# Patient Record
(demographics unavailable — no encounter records)

---

## 2024-12-16 NOTE — REASON FOR VISIT
[Follow-Up] : a follow-up visit [Pre-Visit Preparation] : pre-visit preparation was done [FreeTextEntry1] : HTN, Hypothyroidism, insomnia and asthma. [FreeTextEntry2] : Charts reviewed

## 2024-12-16 NOTE — REVIEW OF SYSTEMS
[Joint Pain] : joint pain [Fever] : no fever [Chills] : no chills [Night Sweats] : no night sweats [Discharge] : no discharge [Pain] : no pain [Earache] : no earache [Hearing Loss] : no hearing loss [Nasal Discharge] : no nasal discharge [Chest Pain] : no chest pain [Palpitations] : no palpitations [Shortness Of Breath] : no shortness of breath [Wheezing] : no wheezing [Abdominal Pain] : no abdominal pain [Nausea] : no nausea [Constipation] : no constipation

## 2024-12-16 NOTE — HISTORY OF PRESENT ILLNESS
[Patient is stable] : patient is stable [Education provided] : education provided [Patient is stable - had PCP appoinment] : patient is stable - had PCP appointment [FreeTextEntry1] : HTN, Hypothyroidism, insomnia and asthma. [FreeTextEntry2] :   COVID SCREEN: Patient or caregiver denies fever, cough, trouble breathing, rash or contact with someone who tested positive for COVID-19.   N95 mask, gloves, eye wear and gown (if indicated) used during visit: YES   Total face to face time with patient is 60 min. .          77 y/o Female with above PMH was seen for follow up visit and covid vaccine. patient lives alone.  Patient denied any SOB, chest pain and palpitation. Denied any constipation/Diarrhea No recent hospitalization. Pt lost weight with diet and Exercise.  Reported she has insomnia for few years ,     Patient reports no weight loss >10 lbs in the past 6 months. No changes in dentition or swallowing reported, No changes in hearing or vision reported. No changes in Cognition reported. Patient denies any symptoms of depression or anxiety. Patient is ADL dependent and IADL dependent. No changes in gait or falls reported. Patient's home environment is safe.

## 2024-12-16 NOTE — HEALTH RISK ASSESSMENT
[HRA Reviewed] : Health risk assessment reviewed [Independent] : using telephone [Some assistance needed] : managing finances [No falls in past year] : Patient reported no falls in the past year [No] : The patient does not have visual impairment [TimeGetUpGo] : 6

## 2024-12-16 NOTE — COUNSELING
[Obese -  ( BMI  >29.9 )] : obese - ( BMI  >29.9 ) [DASH diet recommended] : DASH diet recommended [Improve exercise tolerance] : improve exercise tolerance [Improve mobility] : improve mobility [Improve weight] : improve weight [Decrease hospital use] : decrease hospital use

## 2024-12-16 NOTE — PHYSICAL EXAM
[No Acute Distress] : no acute distress [PERRL] : pupils equal, round and reactive to light [Normal Outer Ear/Nose] : the ears and nose were normal in appearance [No JVD] : no jugular venous distention [Clear to Auscultation] : lungs were clear to auscultation bilaterally [Normal Rate] : heart rate was normal  [Regular Rhythm] : with a regular rhythm [Normal Bowel Sounds] : normal bowel sounds [Normal Supraclavicular Nodes] : no supraclavicular lymphadenopathy [Normal Anterior Cervical Nodes] : no anterior cervical lymphadenopathy [Normal Gait] : normal gait [No Rash] : no rash

## 2025-02-27 NOTE — COUNSELING
[Overweight - ( BMI 25.1 - 29.9 )] : overweight -  ( BMI 25.1 - 29.9 ) [DASH diet recommended] : DASH diet recommended [Continue diet as tolerated] : continue diet as tolerated based on goals of care [Non - Smoker] : non-smoker [Use assistive device to avoid falls] : use assistive device to avoid falls [Remove clutter and unsafe carpeting to avoid falls] : remove clutter and unsafe carpeting to avoid falls [] : foot exam [Improve mobility] : improve mobility [Decrease hospital use] : decrease hospital use [Maintain functional ability] : maintain functional ability [Discussed disease trajectory with patient/caregiver] : discussed disease trajectory with patient/caregiver

## 2025-03-06 NOTE — REVIEW OF SYSTEMS
[Vision Problems] : vision problems [Cough] : cough [Diarrhea] : diarrhea [Negative] : Heme/Lymph [Recent Change In Weight] : ~T no recent weight change [Discharge] : no discharge [Hearing Loss] : no hearing loss [Chest Pain] : no chest pain [Palpitations] : no palpitations [Lower Ext Edema] : no lower extremity edema [Abdominal Pain] : no abdominal pain [Nausea] : no nausea [Constipation] : no constipation [Dysuria] : no dysuria [Incontinence] : no incontinence [Joint Pain] : no joint pain [Joint Stiffness] : no joint stiffness [Skin Rash] : no skin rash [Headache] : no headache [Dizziness] : no dizziness [FreeTextEntry7] : After drinking coffee

## 2025-03-06 NOTE — HEALTH RISK ASSESSMENT
[HRA Reviewed] : Health risk assessment reviewed [Independent] : managing finances [Some assistance needed] : using transportation [No falls in past year] : Patient reported no falls in the past year [Yes] : The patient does have visual impairment [TimeGetUpGo] : 4 [de-identified] : ambulates with cane

## 2025-03-06 NOTE — REVIEW OF SYSTEMS
no [Vision Problems] : vision problems [Cough] : cough [Diarrhea] : diarrhea [Negative] : Heme/Lymph [Recent Change In Weight] : ~T no recent weight change [Discharge] : no discharge [Hearing Loss] : no hearing loss [Chest Pain] : no chest pain [Palpitations] : no palpitations [Lower Ext Edema] : no lower extremity edema [Abdominal Pain] : no abdominal pain [Nausea] : no nausea [Constipation] : no constipation [Dysuria] : no dysuria [Incontinence] : no incontinence [Joint Pain] : no joint pain [Joint Stiffness] : no joint stiffness [Skin Rash] : no skin rash [Headache] : no headache [Dizziness] : no dizziness [FreeTextEntry7] : After drinking coffee

## 2025-03-06 NOTE — HISTORY OF PRESENT ILLNESS
[Patient] : patient [LastPVisitDate] : 03/2023 [FreeTextEntry4] : Dr. Power- cardio [LastSpecialistVisitDate] : 07/2023 [FreeTextEntry1] : impaired mobility  [FreeTextEntry2] :    EMA ZAYAS was scheduled for a telehealth visit via [employer] using real-time audio visual technology. Multiple attempts were made to complete the visit using telehealth, but video failed due to [reason] and [time of failure]. This visit was completed using audio only.     EMA ZAYAS (Nov 23 1946) or her representative consented to the use of telehealth and to continue the use visit using audio only.        EMA ZAYAS was located at their home, [address] [place] [address], at the time of the visit.         The [facility] clinician, LAUREL GUIDRY, was located remotely at their home in [place] at the time of the visit.     The patient, EMA ZAYAS, and the [facility] clinician, LAUREL GUIDRY, participated in the encounter.     Other participants included: [? ] none  RSV Vaccine request  Pneumonia vaccine requested  Ema Zayas, 78-year-old female with asthma, anxiety, HLD, HTN, hypothyroidism and impaired mobility presents for follow up.   Discussed with patient need for cardiology follow up. Discussed ECHO and EKG testing, patient requesting female technician and expresses anxiety related to individuals in home. Educated on importance reports will think over it.   Patient denies chest pain, palpitations or SOB, verbalizes dyspnea on exertion when completing tasks in home. ECHO advised. In person visit to be scheduled.    Social: lives alone with social anxiety DME: ambulates with cane Mobility: no falls in past 6 months Pain: Denies  Mental Status: AOX3 Incontinence: no Constipation: no, Last BM yesterday Sleep: not much improved with melatonin, reports improved with Xanax PRN Appetite: eating well, 3 meals  Mood: calm

## 2025-03-06 NOTE — HEALTH RISK ASSESSMENT
[HRA Reviewed] : Health risk assessment reviewed [Independent] : managing finances [Some assistance needed] : using transportation [No falls in past year] : Patient reported no falls in the past year [Yes] : The patient does have visual impairment [TimeGetUpGo] : 4 [de-identified] : ambulates with cane

## 2025-03-06 NOTE — HEALTH RISK ASSESSMENT
[HRA Reviewed] : Health risk assessment reviewed [Independent] : managing finances [Some assistance needed] : using transportation [No falls in past year] : Patient reported no falls in the past year [Yes] : The patient does have visual impairment [TimeGetUpGo] : 4 [de-identified] : ambulates with cane

## 2025-03-06 NOTE — HISTORY OF PRESENT ILLNESS
[Patient] : patient [LastPVisitDate] : 03/2023 [FreeTextEntry4] : Dr. Power- cardio [LastSpecialistVisitDate] : 07/2023 [FreeTextEntry1] : impaired mobility  [FreeTextEntry2] :    EMA ZAYAS was scheduled for a telehealth visit via [employer] using real-time audio visual technology. Multiple attempts were made to complete the visit using telehealth, but video failed due to [reason] and [time of failure]. This visit was completed using audio only.     EMA ZAYAS (Nov 23 1946) or her representative consented to the use of telehealth and to continue the use visit using audio only.        EMA ZAYAS was located at their home, [address] [place] [address], at the time of the visit.         The [facility] clinician, LAUREL GUIDRY, was located remotely at their home in [place] at the time of the visit.     The patient, EMA ZAYAS, and the [facility] clinician, LAUREL GUIDRY, participated in the encounter.     Other participants included: [? ] none  RSV Vaccine request  Pneumonia vaccine requested  Ema Zayas, 78-year-old female with asthma, anxiety, HLD, HTN, hypothyroidism and impaired mobility presents for follow up.   Discussed with patient need for cardiology follow up. Discussed ECHO and EKG testing, patient requesting female technician and expresses anxiety related to individuals in home. Educated on importance reports will think over it.   Patient denies chest pain, palpitations or SOB, verbalizes dyspnea on exertion when completing tasks in home. ECHO advised. In person visit to be scheduled.    Social: lives alone with social anxiety DME: ambulates with cane Mobility: no falls in past 6 months Pain: Denies  Mental Status: AOX3 Incontinence: no Constipation: no, Last BM yesterday Sleep: not much improved with melatonin, reports improved with Xanax PRN Appetite: eating well, 3 meals  Mood: calm

## 2025-03-06 NOTE — HEALTH RISK ASSESSMENT
[HRA Reviewed] : Health risk assessment reviewed [Independent] : managing finances [Some assistance needed] : using transportation [No falls in past year] : Patient reported no falls in the past year [Yes] : The patient does have visual impairment [TimeGetUpGo] : 4 [de-identified] : ambulates with cane

## 2025-03-06 NOTE — HISTORY OF PRESENT ILLNESS
[Patient] : patient [LastPVisitDate] : 03/2023 [FreeTextEntry4] : Dr. Power- cardio [LastSpecialistVisitDate] : 07/2023 [FreeTextEntry1] : impaired mobility  [FreeTextEntry2] :    EMA ZAYAS was scheduled for a telehealth visit via [employer] using real-time audio visual technology. Multiple attempts were made to complete the visit using telehealth, but video failed due to [reason] and [time of failure]. This visit was completed using audio only.     EMA ZAYAS (Nov 23 1946) or her representative consented to the use of telehealth and to continue the use visit using audio only.        EMA ZAYAS was located at their home, [address] [place] [address], at the time of the visit.         The [facility] clinician, LAUREL GUIDRY, was located remotely at their home in [place] at the time of the visit.     The patient, EMA ZAYAS, and the [facility] clinician, LAUREL GUIDRY, participated in the encounter.     Other participants included: [? ] none  RSV Vaccine request  Pneumonia vaccine requested  Ema Zayas, 78-year-old female with asthma, anxiety, HLD, HTN, hypothyroidism and impaired mobility presents for follow up.   Discussed with patient need for cardiology follow up. Discussed ECHO and EKG testing, patient requesting female technician and expresses anxiety related to individuals in home. Educated on importance reports will think over it.   Patient denies chest pain, palpitations or SOB, verbalizes dyspnea on exertion when completing tasks in home. ECHO advised. In person visit to be scheduled.    Social: lives alone with social anxiety DME: ambulates with cane Mobility: no falls in past 6 months Pain: Denies  Mental Status: AOX3 Incontinence: no Constipation: no, Last BM yesterday Sleep: not much improved with melatonin, reports improved with Xanax PRN Appetite: eating well, 3 meals  Mood: calm

## 2025-03-06 NOTE — REASON FOR VISIT
[Telephone (audio)] : This telephonic visit was provided via audio only technology. [Technical] : patient unable to effectively utilize tele-video due to technical issues. [Follow-Up] : a follow-up visit [Pre-Visit Preparation] : pre-visit preparation was done [Intercurrent Specialty/Sub-specialty Visits] : the patient has intercurrent specialty/sub-specialty visits [FreeTextEntry2] : Chart review

## 2025-03-06 NOTE — HISTORY OF PRESENT ILLNESS
[Patient] : patient [LastPVisitDate] : 03/2023 [FreeTextEntry4] : Dr. Power- cardio [LastSpecialistVisitDate] : 07/2023 [FreeTextEntry1] : impaired mobility  [FreeTextEntry2] :    EMA ZAYAS was scheduled for a telehealth visit via [employer] using real-time audio visual technology. Multiple attempts were made to complete the visit using telehealth, but video failed due to [reason] and [time of failure]. This visit was completed using audio only.     EMA ZAYAS (Nov 23 1946) or her representative consented to the use of telehealth and to continue the use visit using audio only.        EMA ZAYAS was located at their home, [address] [place] [address], at the time of the visit.         The [facility] clinician, LAUREL GUIDRY, was located remotely at their home in [place] at the time of the visit.     The patient, EMA ZAYAS, and the [facility] clinician, LAUREL GUIDRY, participated in the encounter.     Other participants included: [? ] none  RSV Vaccine request  Pneumonia vaccine requested  Ema Zayas, 78-year-old female with asthma, anxiety, HLD, HTN, hypothyroidism and impaired mobility presents for follow up.   Discussed with patient need for cardiology follow up. Discussed ECHO and EKG testing, patient requesting female technician and expresses anxiety related to individuals in home. Educated on importance reports will think over it.   Patient denies chest pain, palpitations or SOB, verbalizes dyspnea on exertion when completing tasks in home. ECHO advised. In person visit to be scheduled.    Social: lives alone with social anxiety DME: ambulates with cane Mobility: no falls in past 6 months Pain: Denies  Mental Status: AOX3 Incontinence: no Constipation: no, Last BM yesterday Sleep: not much improved with melatonin, reports improved with Xanax PRN Appetite: eating well, 3 meals  Mood: calm

## 2025-03-21 NOTE — HISTORY OF PRESENT ILLNESS
[Patient] : patient [LastPVisitDate] : 03/2023 [FreeTextEntry4] : Dr. Power- cardio [LastSpecialistVisitDate] : 07/2023 [FreeTextEntry1] : impaired mobility  [FreeTextEntry2] :    EMA ZAYAS was scheduled for a telehealth visit via [employer] using real-time audio visual technology. Multiple attempts were made to complete the visit using telehealth, but video failed due to [reason] and [time of failure]. This visit was completed using audio only.     EMA ZAYAS (Nov 23 1946) or her representative consented to the use of telehealth and to continue the use visit using audio only.        EMA ZAYAS was located at their home, [address] [place] [address], at the time of the visit.         The [facility] clinician, LAUREL GUIDRY, was located remotely at their home in [place] at the time of the visit.     The patient, EMA ZAYAS, and the [facility] clinician, LAUREL GUIDRY, participated in the encounter.     Other participants included: [? ] none   Ema Zayas, 78-year-old female with asthma, anxiety, HLD, HTN, hypothyroidism and impaired mobility presents for acute visit.    Aamoxicillin x 1 dose 158/82

## 2025-03-21 NOTE — HISTORY OF PRESENT ILLNESS
[Patient] : patient [LastPVisitDate] : 03/2023 [FreeTextEntry4] : Dr. Power- cardio [LastSpecialistVisitDate] : 07/2023 [FreeTextEntry1] : impaired mobility  [FreeTextEntry2] :    EMA ZAYAS was scheduled for a telehealth visit via [employer] using real-time audio visual technology. Multiple attempts were made to complete the visit using telehealth, but video failed due to [reason] and [time of failure]. This visit was completed using audio only.     EMA ZAYAS (Nov 23 1946) or her representative consented to the use of telehealth and to continue the use visit using audio only.        EMA ZAYAS was located at their home, [address] [place] [address], at the time of the visit.         The [facility] clinician, LAUREL GUIDRY, was located remotely at their home in [place] at the time of the visit.     The patient, EMA ZAYAS, and the [facility] clinician, LAUREL GUIDRY, participated in the encounter.     Other participants included: [? ] none   Ema Zayas, 78-year-old female with asthma, anxiety, HLD, HTN, hypothyroidism and impaired mobility presents for acute visit.    Aamoxicillin x 1 dose 158/82

## 2025-03-21 NOTE — HEALTH RISK ASSESSMENT
[HRA Reviewed] : Health risk assessment reviewed [Independent] : managing finances [Some assistance needed] : using transportation [No falls in past year] : Patient reported no falls in the past year [Yes] : The patient does have visual impairment [TimeGetUpGo] : 4 [de-identified] : ambulates with cane

## 2025-03-21 NOTE — REASON FOR VISIT
[Follow-Up] : a follow-up visit [Pre-Visit Preparation] : pre-visit preparation was done [Intercurrent Specialty/Sub-specialty Visits] : the patient has intercurrent specialty/sub-specialty visits [Telephone (audio)] : This telephonic visit was provided via audio only technology. [Technical] : patient unable to effectively utilize tele-video due to technical issues. [FreeTextEntry2] : Chart review

## 2025-03-21 NOTE — HEALTH RISK ASSESSMENT
[HRA Reviewed] : Health risk assessment reviewed [Independent] : managing finances [Some assistance needed] : using transportation [No falls in past year] : Patient reported no falls in the past year [Yes] : The patient does have visual impairment [TimeGetUpGo] : 4 [de-identified] : ambulates with cane

## 2025-04-17 NOTE — REVIEW OF SYSTEMS
[Recent Change In Weight] : ~T no recent weight change [Discharge] : no discharge [Earache] : no earache [Hearing Loss] : no hearing loss [Chest Pain] : no chest pain [Palpitations] : no palpitations [Lower Ext Edema] : no lower extremity edema [Abdominal Pain] : no abdominal pain [Nausea] : no nausea [Constipation] : no constipation [Dysuria] : no dysuria [Incontinence] : no incontinence [Joint Pain] : no joint pain [Joint Stiffness] : no joint stiffness [Skin Rash] : no skin rash [Headache] : no headache [Dizziness] : no dizziness [FreeTextEntry7] : After drinking coffee

## 2025-04-17 NOTE — REASON FOR VISIT
[FreeTextEntry1] : PMH: anxiety, HLD, HTN, hypothyroidism, thrombocytopenia and GERD  [FreeTextEntry2] : Chart review

## 2025-04-17 NOTE — HISTORY OF PRESENT ILLNESS
[FreeTextEntry4] : Dr. Power- cardio [LastPVisitDate] : 03/2023 [LastSpecialistVisitDate] : 07/2023 [FreeTextEntry1] : impaired mobility  [FreeTextEntry2] : 04/07/2025 COVID SCREEN: Patient or caregiver denies fever, cough, trouble breathing, rash or contact with someone who tested positive for COVID-19.   N95 mask, gloves, eye wear and gown (if indicated) used during visit: YES  Total face to face time with patient is 45 min.   Ema Zayas, 78 year old female with HTN, HLD, hypothyroidism, asthma, GERD, throbocytopenia and insomnia presents for acute visit.    History provided by patient  Patient was ED ONLY at [facility] in [place] on 3/29   Patient reports left elbow pain and swelling + tenderness, s/p incision and drainage completed course of Keflex and still completing prednisone. Was started on 40 mg daily x 30 days, advised to taper dose, 30 mg x 7 days, 20 mg x 5 dasy and 10 mg x 2 days. + fluid collection to left elbow, no warmth or redness. Orthopedics referral placed.    Patient endorses intermittent SOB, palpitations and + 2 ankle swelling noted. Patient refusing in home echo as she would like female technician. Patient educated on importance, cardiology referral placed.   AST 57 Platelets 141, patient has repeat labs done at [facility] and reports liver function remained elevated. Advised repeat labs and US abdomen and liver, patient refusing reports she only has 1 vein and wants to save it. GI referral placed.   Patient requesting assistance with car services through Medicaid. SW to follow up.

## 2025-04-17 NOTE — HISTORY OF PRESENT ILLNESS
[FreeTextEntry4] : Dr. Power- cardio [LastPVisitDate] : 03/2023 [LastSpecialistVisitDate] : 07/2023 [FreeTextEntry1] : impaired mobility  [FreeTextEntry2] : 04/07/2025 COVID SCREEN: Patient or caregiver denies fever, cough, trouble breathing, rash or contact with someone who tested positive for COVID-19.   N95 mask, gloves, eye wear and gown (if indicated) used during visit: YES  Total face to face time with patient is 45 min.   Ema Zayas, 78 year old female with HTN, HLD, hypothyroidism, asthma, GERD, throbocytopenia and insomnia presents for acute visit.    History provided by patient  Patient was ED ONLY at [facility] in [place] on 3/29   Patient reports left elbow pain and swelling + tenderness, s/p incision and drainage completed course of Keflex and still completing prednisone. Was started on 40 mg daily x 30 days, advised to taper dose, 30 mg x 7 days, 20 mg x 5 dasy and 10 mg x 2 days. + fluid collection to left elbow, no warmth or redness. Orthopedics referral placed.    Patient endorses intermittent SOB, palpitations and + 2 ankle swelling noted. Patient refusing in home echo as she would like female technician. Patient educated on importance, cardiology referral placed.   AST 57 Platelets 141, patient has repeat labs done at [facility] and reports liver function remained elevated. Advised repeat labs and US abdomen and liver, patient refusing reports she only has 1 vein and wants to save it. GI referral placed.   Patient requesting assistance with car services through Medicaid. SW to follow up.

## 2025-04-17 NOTE — PHYSICAL EXAM
[No Acute Distress] : no acute distress [Normal Voice/Communication] : normal voice communication [Normal Sclera/Conjunctiva] : normal sclera/conjunctiva [Normal Outer Ear/Nose] : the ears and nose were normal in appearance [No JVD] : no jugular venous distention [No Respiratory Distress] : no respiratory distress [No Accessory Muscle Use] : no accessory muscle use [Normal Rate] : heart rate was normal  [Pedal Pulses Present] : the pedal pulses are present [No Edema] : there was no peripheral edema [Non Tender] : non-tender [Soft] : abdomen soft [Normal Anterior Cervical Nodes] : no anterior cervical lymphadenopathy [No CVA Tenderness] : no ~M costovertebral angle tenderness [No Skin Lesions] : no skin lesions [No Motor Deficits] : the motor exam was normal [Oriented x3] : oriented to person, place, and time [de-identified] : Patient sittin upright in chair [de-identified] : left elbow swelling s/p I&D